# Patient Record
Sex: FEMALE | Race: BLACK OR AFRICAN AMERICAN
[De-identification: names, ages, dates, MRNs, and addresses within clinical notes are randomized per-mention and may not be internally consistent; named-entity substitution may affect disease eponyms.]

---

## 2018-02-22 NOTE — ED ABDOMINAL PAIN
General


Chief Complaint:  -Female


Stated Complaint:  CRAMPS/PELVIC PAIN


Nursing Triage Note:  


pt presents to er with complaint of abd cramping. that has lasted x1 week. 


states her LMP was 2 weeks ago. states that it does burn periodically when she 


urinates.


Source of Information:  Patient


Exam Limitations:  No Limitations





History of Present Illness


Date Seen by Provider:  Feb 22, 2018


Time Seen by Provider:  20:05


Initial Comments


This 17-year-old young lady presents to the emergency room with complaints of 

pelvic pain and cramping for the past 2 weeks.  Pain is worse with activity and 

movement.  She was doubled over at track practice today.  She is a collegiate 

athlete.  After practice she had supper and went home to rest.  She woke with 

intense pain and presented to the emergency room.  She has had some mild nausea 

without diarrhea or constipation her last bowel movement was this afternoon and 

was normal but she did have some pelvic pain with the bowel movement.  She 

denies relief of pain after bowel movements.  She reports that it hurts to laugh

, walk, and right in a car.  She has had a small amount of vaginal discharge 

and is sexually active.  She inconsistently uses barrier protection.  She has 

been monogamous with the same partner for 18 months.  Her last menstrual period 

was from February 10-13.  She has pain with intercourse and occasionally some 

pain with urination.





Allergies and Home Medications


Allergies


Coded Allergies:  


     No Allergy Information Available (Unverified , 2/22/18)





Review of Systems


Constitutional:  no symptoms reported


EENTM:  No Symptoms Reported


Cardiovascular:  No Symptoms Reported


Gastrointestinal:  See HPI


Genitourinary:  See HPI


Musculoskeletal:  no symptoms reported


Skin:  no symptoms reported


Psychiatric/Neurological:  No Symptoms Reported


Endocrine:  No Symptoms Reported





Past Medical-Social-Family Hx


Patient Social History


Alcohol Use:  Denies Use


Recreational Drug Use:  No


Smoking Status:  Never a Smoker


Recent Foreign Travel:  No


Contact w/Someone Who Travel:  No


Recent Infectious Disease Expo:  No


Recent Hopitalizations:  No


Ebola Symptoms:  Denies Symptoms Listed





Immunizations Up To Date


PED Vaccines UTD:  Yes





Seasonal Allergies


Seasonal Allergies:  No





Surgeries


History of Surgeries:  Yes (wisdom teeth)





Respiratory


History of Respiratory Disorde:  No





Cardiovascular


History of Cardiac Disorders:  No





Neurological


History of Neurological Disord:  No





Genitourinary


History of Genitourinary Disor:  No





Gastrointestinal


History of Gastrointestinal Di:  No





Musculoskeletal


History of Musculoskeletal Dis:  No





Endocrine


History of Endocrine Disorders:  No





HEENT


History of HEENT Disorders:  No





Cancer


History of Cancer:  No





Psychosocial


History of Psychiatric Problem:  No





Integumentary


History of Skin or Integumenta:  No





Blood Transfusions


History of Blood Disorders:  No





Physical Exam


Vital Signs





 VS - Last 72 Hours, by Label








 2/22/18 2/23/18





 20:20 01:25


 


Temp 98.2 98.2


 


Pulse 69 69


 


Resp 17 17


 


B/P (MAP) 115/81 


 


Pulse Ox  100


 


O2 Delivery Room Air Room Air





Capillary Refill :


General Appearance:  WD/WN, no apparent distress, thin


HEENT:  PERRL/EOMI, normal ENT inspection


Neck:  normal inspection


Respiratory:  lungs clear, normal breath sounds, no respiratory distress, no 

accessory muscle use


Cardiovascular:  regular rate, rhythm, no edema


Gastrointestinal:  normal bowel sounds, soft, tenderness (across the pelvis and 

more prominent in the right lower quadrant), No mass, other (positive psoas 

right greater than left)


Genital/Rectal:  normal genital exam, other (mild erythema around the cervical 

os with some greenish color mucus coming from the office.  No cervical motion 

tenderness.)


Extremities:  normal inspection, no pedal edema


Back:  normal inspection


Pelvic:  normal adnexa, no cerv. motion tender, no masses, discharge


Neurologic/Psychiatric:  CNs II-XII nml as tested, no motor/sensory deficits, 

alert, normal mood/affect, oriented x 3


Skin:  normal color, warm/dry





Progress/Results/Core Measures


Results/Orders


Lab Results





Laboratory Tests








Test


  2/22/18


20:20 2/22/18


21:12 2/22/18


22:44 Range/Units


 


 


Urine Color YELLOW     


 


Urine Clarity CLEAR     


 


Urine pH 8    5-9  


 


Urine Specific Gravity 1.015 L   1.016-1.022  


 


Urine Protein NEGATIVE    NEGATIVE  


 


Urine Glucose (UA) NEGATIVE    NEGATIVE  


 


Urine Ketones NEGATIVE    NEGATIVE  


 


Urine Nitrite NEGATIVE    NEGATIVE  


 


Urine Bilirubin NEGATIVE    NEGATIVE  


 


Urine Urobilinogen NORMAL    NORMAL  MG/DL


 


Urine Leukocyte Esterase 1+ H   NEGATIVE  


 


Urine RBC (Auto) NEGATIVE    NEGATIVE  


 


Urine RBC 0-2     /HPF


 


Urine WBC 5-10 H    /HPF


 


Urine Squamous Epithelial


Cells 0-2 


  


  


   /HPF


 


 


Urine Crystals NONE     /LPF


 


Urine Bacteria NONE     /HPF


 


Urine Casts NONE     /LPF


 


Urine Mucus NEGATIVE     /LPF


 


Urine Culture Indicated YES     


 


Urine Pregnancy Test NEGATIVE    NEGATIVE  


 


White Blood Count


  


  8.5 


  


  4.3-11.0


10^3/uL


 


Red Blood Count


  


  4.04 L


  


  4.35-5.85


10^6/uL


 


Hemoglobin  11.1 L  11.5-16.0  G/DL


 


Hematocrit  33 L  35-52  %


 


Mean Corpuscular Volume  82   80-99  FL


 


Mean Corpuscular Hemoglobin  28   25-34  PG


 


Mean Corpuscular Hemoglobin


Concent 


  33 


  


  32-36  G/DL


 


 


Red Cell Distribution Width  14.9 H  10.0-14.5  %


 


Platelet Count


  


  279 


  


  130-400


10^3/uL


 


Mean Platelet Volume  9.9   7.4-10.4  FL


 


Neutrophils (%) (Auto)  53   42-75  %


 


Lymphocytes (%) (Auto)  36   12-44  %


 


Monocytes (%) (Auto)  10   0-12  %


 


Eosinophils (%) (Auto)  2   0-10  %


 


Basophils (%) (Auto)  1   0-10  %


 


Neutrophils # (Auto)  4.5   1.8-7.8  X 10^3


 


Lymphocytes # (Auto)  3.0   1.0-4.0  X 10^3


 


Monocytes # (Auto)  0.8   0.0-1.0  X 10^3


 


Eosinophils # (Auto)


  


  0.1 


  


  0.0-0.3


10^3/uL


 


Basophils # (Auto)


  


  0.1 


  


  0.0-0.1


10^3/uL


 


Sodium Level  138   135-145  MMOL/L


 


Potassium Level  4.0   3.6-5.0  MMOL/L


 


Chloride Level  106     MMOL/L


 


Carbon Dioxide Level  21   21-32  MMOL/L


 


Anion Gap  11   5-14  MMOL/L


 


Blood Urea Nitrogen  13   7-18  MG/DL


 


Creatinine


  


  0.79 


  


  0.60-1.30


MG/DL


 


Estimat Glomerular Filtration


Rate 


  > 60 


  


   


 


 


BUN/Creatinine Ratio  16    


 


Glucose Level  98     MG/DL


 


Calcium Level  9.3   8.5-10.1  MG/DL


 


Total Bilirubin  0.2   0.1-1.0  MG/DL


 


Aspartate Amino Transf


(AST/SGOT) 


  19 


  


  5-34  U/L


 


 


Alanine Aminotransferase


(ALT/SGPT) 


  9 


  


  0-55  U/L


 


 


Alkaline Phosphatase  72     U/L


 


C-Reactive Protein High


Sensitivity 


  0.01 


  


  0.00-0.50


MG/DL


 


Total Protein  7.1   6.4-8.2  GM/DL


 


Albumin  4.0   3.2-4.5  GM/DL


 


Serum Pregnancy Test,


Qualitative 


  NEGATIVE 


  


  NEGATIVE  


 








My Orders





Orders - BRUEGGEMANN,JANKI T MD


Hcg,Qualitative Urine (2/22/18 20:05)


Ua Culture If Indicated (2/22/18 20:05)


Urine Culture (2/22/18 20:20)


Cbc With Automated Diff (2/22/18 21:04)


Comprehensive Metabolic Panel (2/22/18 21:04)


Hs C Reactive Protein (2/22/18 21:04)


Saline Lock/Iv-Start (2/22/18 21:04)


Hcg,Qualitative Serum (2/22/18 21:05)


Wet Prep (2/22/18 21:53)


Neisseria Gonorrhea Dna (2/22/18 21:53)


Chlamydia Dna (2/22/18 21:53)


Genital Culture (2/22/18 21:53)


Abdomen/Kub 1view (2/22/18 21:55)


Us Non Ob Transvaginal [id number] (2/22/18 21:53)


Us Appendix [id number] (2/22/18 22:57)


Ct Abd/Pelv W (Appendicitis) (2/22/18 23:27)


Iohexol Injection (Omnipaque 350 Mg/Ml 1 (2/22/18 23:45)


Sodium Chloride Flush (Catheter Flush Sy (2/22/18 23:45)


Ns (Ivpb) (Sodium Chloride 0.9%) (2/22/18 23:45)


Ceftriaxone Injection (Rocephin Injectio (2/23/18 00:45)


Azithromycin Tablet (Zithromax Tablet) (2/23/18 00:45)


Ketorolac Injection (Toradol Injection) (2/23/18 00:45)





Medications Given in ED





Current Medications








 Medications  Dose


 Ordered  Sig/Raven


 Route  Start Time


 Stop Time Status Last Admin


Dose Admin


 


 Azithromycin  1,000 mg  ONCE  ONCE


 PO  2/23/18 00:45


 2/23/18 00:47 DC 2/23/18 00:57


1,000 MG


 


 Ceftriaxone


 Sodium 1000 mg/


 Sodium Chloride  50 ml @ 


 100 mls/hr  ONCE  ONCE


 IV  2/23/18 00:45


 2/23/18 01:14 DC 2/23/18 00:57


100 MLS/HR


 


 Iohexol  75 ml  ONCE  ONCE


 IV  2/22/18 23:45


 2/22/18 23:46 DC 2/22/18 23:56


75 ML


 


 Ketorolac


 Tromethamine  30 mg  ONCE  ONCE


 IVP  2/23/18 00:45


 2/23/18 00:47 DC 2/23/18 00:56


30 MG


 


 Sodium Chloride  10 ml  AS NEEDED  PRN


 IV  2/22/18 23:45


 2/23/18 01:27 DC 2/22/18 23:57


10 ML


 


 Sodium Chloride  250 ml  ONCE  ONCE


 IV  2/22/18 23:45


 2/22/18 23:46 DC 2/22/18 23:57


80 ML








Vital Signs/I&O





Vital Sign - Last 12Hours








 2/22/18 2/23/18





 20:20 01:25


 


Temp 98.2 98.2


 


Pulse 69 69


 


Resp 17 17


 


B/P (MAP) 115/81 


 


Pulse Ox  100


 


O2 Delivery Room Air Room Air








Progress Note #1:  


   Time:  21:50


Progress Note


Patient was seen and examined.  Labs were obtained.  UA and labs were reviewed.

  Given normal CRP, WBC, and temperature, suspicion for appendicitis has been 

lessened.  I discussed options of going directly to CT scan versus ultrasound.  

Risks and benefits of each study were reviewed with patient and family.  

Patient and family wish to proceed with ultrasound as the safer option at this 

point.  I also recommended a pelvic exam as patient has had pain with 

intercourse and some vaginal discharge.


Progress Note #2:  


   Time:  23:28


Progress Note


Patient has a right-sided pelvic kidney identified on ultrasound.  I discussed 

with patient and mother.  Since this obscures the pelvic exam and appendix on 

ultrasound, we elected to proceed with CT scan.  Ultrasound technician also 

noted she was significantly tender on ultrasound.


Progress Note #3:  


Progress Note


There was question of a right adnexal cyst which could be contributing.  

Appendix was not seen on CT scan.  No other acute abnormalities were identified 

to explain her pain.  Toradol was given for treatment of pain.  Patient was 

empirically treated for possible vaginal infections with Rocephin 1 g IV and 

azithromycin 1 g orally.





Diagnostic Imaging





   Diagonstic Imaging:  Xray


   Plain Films/CT/US/NM/MRI:  abdomen


Comments


Abdominal x-ray viewed by me.  Report not yet available.  Moderate amount of 

stool and gas in the colon without any other acute abnormalities.








   Diagonstic Imaging:  Ultrasound


   Plain Films/CT/US/NM/MRI:  pelvis


Comments


Pelvic ultrasound revealed no visible appendix.  There is a questionable 

adnexal cyst on the right.  Patient was noted to have a right pelvic kidney.  

No other acute pathology found to explain patient's pain.








   Diagonstic Imaging:  CT


   Plain Films/CT/US/NM/MRI:  abdomen, pelvis


Comments


CT scans viewed by me and statin rad report reviewed.  There is questionable 

small bowel thickening in the left upper quadrant.  Right adnexal corpus luteum 

cyst noted.  Appendix not confidently visualized.  No signs of appendicitis.  

Right pelvic kidney.





Departure


Impression


Impression:  


 Primary Impression:  


 Pelvic kidney


 Additional Impressions:  


 Right lower quadrant pain


 Adnexal cyst


Disposition:  01 HOME, SELF-CARE


Condition:  Improved





Departure-Patient Inst.


Decision time for Depature:  00:50


Referrals:  


KIERA ZAVALA MD (PCP/Family)


Primary Care Physician


Patient Instructions:  Acute Abdomen (Belly Pain)





Add. Discharge Instructions:  


Follow-up with Dr. Zavala next week.  You may take ibuprofen up to 400 mg 

every 6 hours as needed for pain.  Add Tylenol up to 650 mg every 6 hours as 

needed for additional pain relief.  If you develop fevers, vomiting, worsening 

pain, or other symptoms, please return to the emergency room.  Observe vaginal 

rest (no intercourse, tampons, etc.) until cleared by your doctor.





Please use a second form of birth control in addition to condoms until you are 

ready to become pregnant.  Discuss your pelvic kidney and pelvic anatomy with 

your obstetrician before you decide to get pregnant.  Please inform any 

healthcare providers you interact with in the future that you have a displaced 

pelvic kidney on the right.











All discharge instructions reviewed with patient and/or family. Voiced 

understanding.





Copy


Copies To 1:   KIERA ZAVALA MD, JOSHUA T MD Feb 22, 2018 22:55

## 2018-02-22 NOTE — XMS REPORT
Continuity of Care Document

 Created on: 2018



AUDREY MADRID

External Reference #: [id number]

: 2000

Sex: Female



Demographics







 Address  [address] [place]  [address]

 

 Home Phone  [phone] x

 

 Preferred Language  Unknown

 

 Marital Status  Unknown

 

 L[employer]-day Affiliation  Unknown

 

 Race  Unknown

 

 Ethnic Group  Unknown





Author







 Author  [facility]

 

 Address  Unknown

 

 Phone  Unavailable



              



Allergies

      



There is no data.                  



Medications

      



There is no data.                  



Problems

      





 Date Dx Coded            Attending            Type            Code            
Diagnosis            Diagnosed By        

 

 2013            MARGO RICKS                         V04.89   
         GARDASIL (HPV) DX                     

 

 2013            MARGO RICKS                         V05.3    
        HEP A (PED/ADOL 2-DOSE) DX                     

 

 2013            MARGO RICSK                         V05.4    
        VARICELLA DX                     

 

 2013            MARGO RICKS                         V70.3    
        SPORTS PHYSICAL                     

 

 10/02/2013            MARGO RICKS                         462      
      PHARYNGITIS ACUTE                     

 

 2016            DEN SALEEM            Ot            M25.572   
         PAIN IN LEFT ANKLE AND JOINTS OF LEFT FO                     

 

 2017            DEN SALEEM            Ot            M25.572   
         PAIN IN LEFT ANKLE AND JOINTS OF LEFT FO                     



                              



Procedures

      





 Code            Description            Performed By            Performed On   
     

 

             53764                                  STREP A  (IN-HOUSE)        
                           10/02/2013        



                  



Results

      



There is no data.              



Encounters

      





 ACCT No.            Visit Date/Time            Discharge            Status    
        Pt. Type            Provider            Facility            Loc./Unit  
          Complaint        

 

 [id number]            10/02/2013 13:52:00            10/02/2013 23:59:59          
  [facility]            Outpatient            MARGO RICKS                  
                             

 

 [id number]            01/15/2013 20:22:56                                      
RECURRING                                                            

 

 [id number]            2016 13:19:00            2016 23:59:59    
        [facility]            Outpatient            DEN SALEEM            
[facility]            RAD            LEFT ANKLE PAIN, 
WEAKNESS

## 2018-02-22 NOTE — XMS REPORT
[facility]

 Created on: 2015



[address]

External Reference #: [id number]

: 2000

Sex: Female



Demographics







 Address  [address] [place]  [address]

 

 Home Phone  [phone]

 

 Preferred Language  Unknown

 

 Marital Status  Unknown

 

 [employer] Affiliation  Unknown

 

 Race  Other Race

 

 Ethnic Group  Not  or 





Author







 Author  MARGO MONTOYA

 

 Organization  [employer]

 

 Address  Unknown

 

 Phone  Unavailable







Care Team Providers







 Care Team Member Name  Role  Phone

 

 MARGO MONTOYA  CP  Unavailable



                                                                



Allergies, Adverse Reactions, Alerts

          





 Substance  Reaction  Event Type

 

 N.K.D.A.  Info Not Available  Non Drug Allergy



                                                                               
         



Problems

          





 Problem Type  Condition  Code  Onset Dates  Condition Status

 

 Assessment  Dietary counseling  Z71.3     Active

 

 Problem  STATE HEP A (ADULT) DX  V05.3     Active

 

 Problem  GARDASIL (HPV) DX  V04.89     Active

 

 Problem  Acute pharyngitis  462     Active

 

 Assessment  Sports physical  Z02.5     Active

 

 Assessment  Exercise counseling  Z71.89     Active

 

 Problem  Other general medical examination for administrative purposes  V70.3 
    Active

 

 Problem  VARICELLA DX  V05.4     Active



                                                                               
                                                                               



Medications

          No Known Medications                                                 
                                       



Procedures

          





 Procedure  Coding System  Code  Date

 

 Preventive Care Est Pt. Age 12-17  CPT-4  79579  2015

 

 VISUAL ACUITY SCREEN  CPT-4  70902  2015



                                                                               
                             



Vital Signs

          





 Date/Time:  2015

 

 Temperature  98 F

 

 BMIPercentile  29.48 %

 

 Weight  114 lbs

 

 Height  64.25 in

 

 BMI  19.41 Index

 

 Blood Pressure Diastolic  52 mmHg

 

 Blood Pressure Systolic  91 mmHg

 

 Cardiac Monitoring Heart Rate  72 bpm

 

 Wt Percentile  33.71 %

 

 Ht Percentile  51.77 %



                                                                              



Results

          No Known Results                                                     
               



Summary Purpose

          [employer] Submission

## 2018-02-23 NOTE — DIAGNOSTIC IMAGING REPORT
PROCEDURE: CT abdomen and pelvis with contrast, rule out

appendicitis.



TECHNIQUE: Multiple contiguous axial images were obtained through

the abdomen and pelvis after the administration of intravenous

contrast.



INDICATION: Right lower quadrant pain and cramping.



COMPARISON: No prior studies are available for comparison.



FINDINGS:  

The lung bases are clear. The liver is unremarkable. The

gallbladder is contracted. The pancreas and spleen are

unremarkable. No adrenal mass is identified. There is a right

pelvic kidney. The left kidney is unremarkable. Aorta is

nonaneurysmal. Bowel loops are  normal caliber although there

does appear to be questional circumferential wall thickening

involving small bowel loops in the left upper quadrant. No free

fluid in the abdomen is seen. There is a questionable cyst in the

right adnexa, perhaps ovarian. No definite free fluid in the

pelvis is seen. The uterus and bladder are unremarkable. The

appendix is not definitely visualized. No inflammatory process is

seen. There is moderate stool in the right colon.



IMPRESSION:

1. Nonvisualized appendix. No inflammatory process in the right

lower quadrant is identified.

2. Questionable wall thickening involving small bowel loops in

the left upper quadrant, perhaps owing to nonspecific enteritis.

3. Right pelvic kidney.











Dictated by: 



  Dictated on workstation # [id number]

## 2018-02-23 NOTE — DIAGNOSTIC IMAGING REPORT
INDICATION: Pain



FINDINGS:



The right kidney is pelvic, nondilated. The appendix cannot be

identified. There was no fluid collection or regional free fluid

and no noncompressible loop of bowel.



IMPRESSION:



Right pelvic kidney, nonvisualization of the appendix. No

noncompressible loop of bowel, free fluid or regional fluid

collection.



Dictated by: 



  Dictated on workstation # [id number]

## 2018-02-23 NOTE — DIAGNOSTIC IMAGING REPORT
INDICATION: Pelvic pain.



FINDINGS:  

A small amount of pelvic free fluid is identified showing no

obvious complexity. There is color flow to the ovaries. There is

no evidence for adnexal torsion. There is no fibroid or

myometrial mass, no loculated fluid collection. The endometrium

and myometrium appeared normal.



IMPRESSION:

Small pelvic free fluid showed no complexity and is likely

physiologic. No adnexal lesion. No acute finding.



Dictated by: 



  Dictated on workstation # [id number]
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
## 2020-10-18 ENCOUNTER — HOSPITAL ENCOUNTER (EMERGENCY)
Dept: HOSPITAL 75 - ER | Age: 20
Discharge: HOME | End: 2020-10-18
Payer: COMMERCIAL

## 2020-10-18 VITALS — WEIGHT: 74.96 LBS | BODY MASS INDEX: 12.49 KG/M2 | HEIGHT: 64.96 IN

## 2020-10-18 VITALS — SYSTOLIC BLOOD PRESSURE: 99 MMHG | DIASTOLIC BLOOD PRESSURE: 61 MMHG

## 2020-10-18 DIAGNOSIS — R06.4: ICD-10-CM

## 2020-10-18 DIAGNOSIS — F10.129: ICD-10-CM

## 2020-10-18 DIAGNOSIS — F17.290: ICD-10-CM

## 2020-10-18 DIAGNOSIS — F12.10: Primary | ICD-10-CM

## 2020-10-18 DIAGNOSIS — F41.9: ICD-10-CM

## 2020-10-18 LAB
ALBUMIN SERPL-MCNC: 4.7 GM/DL (ref 3.2–4.5)
ALP SERPL-CCNC: 63 U/L (ref 40–136)
ALT SERPL-CCNC: 11 U/L (ref 0–55)
APAP SERPL-MCNC: < 10 UG/ML (ref 10–30)
APTT PPP: YELLOW S
BACTERIA #/AREA URNS HPF: NEGATIVE /HPF
BARBITURATES UR QL: NEGATIVE
BASOPHILS # BLD AUTO: 0.1 10^3/UL (ref 0–0.1)
BASOPHILS NFR BLD AUTO: 1 % (ref 0–10)
BENZODIAZ UR QL SCN: NEGATIVE
BILIRUB SERPL-MCNC: 0.3 MG/DL (ref 0.1–1)
BILIRUB UR QL STRIP: NEGATIVE
BUN/CREAT SERPL: 10
CALCIUM SERPL-MCNC: 9.5 MG/DL (ref 8.5–10.1)
CHLORIDE SERPL-SCNC: 106 MMOL/L (ref 98–107)
CO2 SERPL-SCNC: 17 MMOL/L (ref 21–32)
COCAINE UR QL: POSITIVE
CREAT SERPL-MCNC: 0.81 MG/DL (ref 0.6–1.3)
EOSINOPHIL # BLD AUTO: 0 10^3/UL (ref 0–0.3)
EOSINOPHIL NFR BLD AUTO: 0 % (ref 0–10)
FIBRINOGEN PPP-MCNC: CLEAR MG/DL
GFR SERPLBLD BASED ON 1.73 SQ M-ARVRAT: > 60 ML/MIN
GLUCOSE SERPL-MCNC: 107 MG/DL (ref 70–105)
GLUCOSE UR STRIP-MCNC: NEGATIVE MG/DL
HCT VFR BLD CALC: 38 % (ref 35–52)
HGB BLD-MCNC: 13.6 G/DL (ref 11.5–16)
KETONES UR QL STRIP: NEGATIVE
LEUKOCYTE ESTERASE UR QL STRIP: NEGATIVE
LYMPHOCYTES # BLD AUTO: 2.6 10^3/UL (ref 1–4)
LYMPHOCYTES NFR BLD AUTO: 22 % (ref 12–44)
MAGNESIUM SERPL-MCNC: 2 MG/DL (ref 1.6–2.4)
MANUAL DIFFERENTIAL PERFORMED BLD QL: NO
MCH RBC QN AUTO: 32 PG (ref 25–34)
MCHC RBC AUTO-ENTMCNC: 36 G/DL (ref 32–36)
MCV RBC AUTO: 90 FL (ref 80–99)
METHADONE UR QL SCN: NEGATIVE
METHAMPHETAMINE SCREEN URINE S: NEGATIVE
MONOCYTES # BLD AUTO: 0.7 10^3/UL (ref 0–1)
MONOCYTES NFR BLD AUTO: 6 % (ref 0–12)
NEUTROPHILS # BLD AUTO: 8.8 10^3/UL (ref 1.8–7.8)
NEUTROPHILS NFR BLD AUTO: 72 % (ref 42–75)
NITRITE UR QL STRIP: NEGATIVE
OPIATES UR QL SCN: NEGATIVE
OTHER ELEMENTS URNS MICRO: (no result) /HPF
OXYCODONE UR QL: NEGATIVE
PH UR STRIP: 7 [PH] (ref 5–9)
PLATELET # BLD: 362 10^3/UL (ref 130–400)
PMV BLD AUTO: 9.3 FL (ref 9–12.2)
POTASSIUM SERPL-SCNC: 3.5 MMOL/L (ref 3.6–5)
PROPOXYPH UR QL: NEGATIVE
PROT SERPL-MCNC: 7.7 GM/DL (ref 6.4–8.2)
PROT UR QL STRIP: NEGATIVE
RBC #/AREA URNS HPF: (no result) /HPF
SODIUM SERPL-SCNC: 142 MMOL/L (ref 135–145)
SP GR UR STRIP: <=1.005 (ref 1.02–1.02)
SQUAMOUS #/AREA URNS HPF: (no result) /HPF
TRICYCLICS UR QL SCN: NEGATIVE
WBC # BLD AUTO: 12.2 10^3/UL (ref 4.3–11)
WBC #/AREA URNS HPF: (no result) /HPF

## 2020-10-18 PROCEDURE — 83735 ASSAY OF MAGNESIUM: CPT

## 2020-10-18 PROCEDURE — 80053 COMPREHEN METABOLIC PANEL: CPT

## 2020-10-18 PROCEDURE — 36415 COLL VENOUS BLD VENIPUNCTURE: CPT

## 2020-10-18 PROCEDURE — 80306 DRUG TEST PRSMV INSTRMNT: CPT

## 2020-10-18 PROCEDURE — 80320 DRUG SCREEN QUANTALCOHOLS: CPT

## 2020-10-18 PROCEDURE — 93005 ELECTROCARDIOGRAM TRACING: CPT

## 2020-10-18 PROCEDURE — 80329 ANALGESICS NON-OPIOID 1 OR 2: CPT

## 2020-10-18 PROCEDURE — 85025 COMPLETE CBC W/AUTO DIFF WBC: CPT

## 2020-10-18 PROCEDURE — 81000 URINALYSIS NONAUTO W/SCOPE: CPT

## 2020-10-18 PROCEDURE — 99283 EMERGENCY DEPT VISIT LOW MDM: CPT

## 2020-10-18 PROCEDURE — 93041 RHYTHM ECG TRACING: CPT

## 2020-10-18 PROCEDURE — 84703 CHORIONIC GONADOTROPIN ASSAY: CPT

## 2020-10-18 NOTE — NUR
[facility] TO GET PATIENT UP TO [facility] TO VOID. SAMPLE 
TO BE COLLECTED.

## 2020-10-18 NOTE — NUR
ATTEMPTED CALL TO HER DAD FOR HER HOWEVER THE NUMBER IN THE SYSTEM IS 
INCORRECT. PATIENT NOTIFIED SO SHE CAN MAKE OTHER ARRANGEMENTS FOR A RIDE.

## 2020-10-18 NOTE — NUR
THIS RN ASKED PATIENT IF SHE WOULD LIKE ME TO CALL HER DAD FOR HER TO COME AND 
PICK HER UP, PATIENT AGREES AND THANKS THIS RN. PATIENT ESCORTED TO 
REGISTRATION. PATIENT IS STEADY ON HER FEET AND ALERT AND ORIENTED X4.

## 2020-10-18 NOTE — ED PSYCHOSOCIAL
General


Chief Complaint:  Substance Abuse


Stated Complaint:  OVERDOSE


Source:  patient, EMS, old records





History of Present Illness


Date Seen by Provider:  Oct 18, 2020


Time Seen by Provider:  03:13


Initial Comments


PT ARRIVES VIA EMS FROM A FRIEND'S HOUSE'


PT STATES [facility] AND DRANK "2 RED BULL VODKAS" AND ALSO TOOK 

"DIEUDONNE" BY MOUTH AND SNORTED A LINE OF COCAINE. 


STATES SHE DOES NOT DRINK VERY OFTEN AND HAS NEVER USED EITHER OF THESE DRUGS 

BEFORE. 


STATES SHE SMOKES MARIJUANA DAILY. 


NOW PT IS EXTREMELY ANXIOUS, HYPERVENTILATING TO THE POINT OF PASSING OUT, 

QUICKLY WAKING TO VERBAL AND TACTILE STIMULI AND IMMEDIATELY STARTS 

HYPERVENTILATING AGAIN. 





PT HAS HISTORY OF ANXIETY WITH HYPERVENTILATION


PT HAS BEEN PRESCRIBED EFFEXOR AND HYDROXYZINE, BUT HAS NOT TAKEN ANY TODAY





PCP: DR. ARIAS





Allergies and Home Medications


Allergies


Coded Allergies:  


     No Known Drug Allergies (Unverified , 10/18/20)





Home Medications


Hydroxyzine Pamoate 50 Mg Capsule, 50 MG PO Q6H


   Prescribed by: GERMAIN MONTES on 10/21/18 0218


Nitrofurantoin Monohyd/M-Cryst 100 Mg Capsule, 100 MG PO BID


   Prescribed by: GERMAIN MONTES on 10/21/18 0218





Patient Home Medication List


Home Medication List Reviewed:  Yes





Review of Systems


Constitutional:  no symptoms reported


Respiratory:  other (HYPERVENTILATING)


Birth Control/STD Prophylaxis:  BC Pills


Psychiatric/Neurological:  See HPI





Past Medical-Social-Family Hx


Past Med/Social Hx:  Reviewed and Corrections made


Patient Social History


Alcohol Use:  Occasionally Uses


Recreational Drug Use:  Yes (THC DAILY; 10/18/20-"DIEUDONNE" AND COCAINE. )


Drug of Choice:  THC DAILY; 10/18/20--"DIEUDONNE" AND COCAINE


Smoking Status:  Current Everyday Smoker (VAPES)


Type Used:  Electronic/Vapor


2nd Hand Smoke Exposure:  No


Recent Hopitalizations:  No





Immunizations Up To Date


PED Vaccines UTD:  Yes





Seasonal Allergies


Seasonal Allergies:  No





Past Medical History


Surgeries:  Yes (BILATERAL KNEE SCOPES; WISDOM TEETH REMOVED)


Orthopedic


Respiratory:  No


Cardiac:  No


Neurological:  No


Genitourinary:  No


Gastrointestinal:  No


Musculoskeletal:  No


Endocrine:  No


HEENT:  Yes (S/P T&A)


Tonsilitis


Cancer:  No


Psychosocial:  Yes (ANOREXIA)


Eating Disorder, Anxiety


Integumentary:  No


Blood Disorders:  No





Physical Exam





Vital Signs - First Documented








 10/18/20 10/18/20





 03:17 05:21


 


Temp 36.0 


 


Pulse 117 


 


Resp 40 


 


B/P (MAP) 117/78 (91) 


 


Pulse Ox 100 


 


O2 Delivery  Room Air





Capillary Refill :


Height, Weight, BMI


Height: 5'5.00"


Weight: 110lbs. oz. 49.456284oa; 14.06 BMI


Method:Estimated


General Appearance:  thin, other (SEVERE HYPERVENTILATION, EXTREMELY ANXIOUS, 

HYSTERICAL. VOICE STUTTERING, AND PT TREMULOUS. )


HEENT:  PERRL/EOMI


Neck:  normal inspection


Respiratory:  normal breath sounds, other (HYPERVENTILATING)


Cardiovascular:  no edema, no murmur, tachycardia


Gastrointestinal:  non tender, soft


Extremities:  normal inspection, normal capillary refill


Neurologic/Psychiatric:  CNs II-XII nml as tested, no motor/sensory deficits, 

alert, oriented x 3


Appearance/Memory:  appropriate appearance, neat, no memory impairment


Behavior/Eye Contact:  cooperative, good eye contact, increased rate of speech


Thoughts/Hallucinations:  no apparent hallucination


Skin:  normal color (PT IS BLACK); No rash





Progress/Results/Core Measures


Results/Orders


Lab Results





Laboratory Tests








Test


 10/18/20


03:17 10/18/20


03:43 Range/Units


 


 


White Blood Count


 12.2 H


 


 4.3-11.0


10^3/uL


 


Red Blood Count


 4.25 


 


 3.80-5.11


10^6/uL


 


Hemoglobin 13.6   11.5-16.0  g/dL


 


Hematocrit 38   35-52  %


 


Mean Corpuscular Volume 90   80-99  fL


 


Mean Corpuscular Hemoglobin 32   25-34  pg


 


Mean Corpuscular Hemoglobin


Concent 36 


 


 32-36  g/dL





 


Red Cell Distribution Width 12.0   10.0-14.5  %


 


Platelet Count


 362 


 


 130-400


10^3/uL


 


Mean Platelet Volume 9.3   9.0-12.2  fL


 


Immature Granulocyte % (Auto) 0    %


 


Neutrophils (%) (Auto) 72   42-75  %


 


Lymphocytes (%) (Auto) 22   12-44  %


 


Monocytes (%) (Auto) 6   0-12  %


 


Eosinophils (%) (Auto) 0   0-10  %


 


Basophils (%) (Auto) 1   0-10  %


 


Neutrophils # (Auto)


 8.8 H


 


 1.8-7.8


10^3/uL


 


Lymphocytes # (Auto)


 2.6 


 


 1.0-4.0


10^3/uL


 


Monocytes # (Auto)


 0.7 


 


 0.0-1.0


10^3/uL


 


Eosinophils # (Auto)


 0.0 


 


 0.0-0.3


10^3/uL


 


Basophils # (Auto)


 0.1 


 


 0.0-0.1


10^3/uL


 


Immature Granulocyte # (Auto)


 0.0 


 


 0.0-0.1


10^3/uL


 


Sodium Level 142   135-145  MMOL/L


 


Potassium Level 3.5 L  3.6-5.0  MMOL/L


 


Chloride Level 106     MMOL/L


 


Carbon Dioxide Level 17 L  21-32  MMOL/L


 


Anion Gap 19 H  5-14  MMOL/L


 


Blood Urea Nitrogen 8   7-18  MG/DL


 


Creatinine


 0.81 


 


 0.60-1.30


MG/DL


 


Estimat Glomerular Filtration


Rate > 60 


 


  





 


BUN/Creatinine Ratio 10    


 


Glucose Level 107 H    MG/DL


 


Calcium Level 9.5   8.5-10.1  MG/DL


 


Corrected Calcium    8.5-10.1  MG/DL


 


Magnesium Level 2.0   1.6-2.4  MG/DL


 


Total Bilirubin 0.3   0.1-1.0  MG/DL


 


Aspartate Amino Transf


(AST/SGOT) 20 


 


 5-34  U/L





 


Alanine Aminotransferase


(ALT/SGPT) 11 


 


 0-55  U/L





 


Alkaline Phosphatase 63     U/L


 


Total Protein 7.7   6.4-8.2  GM/DL


 


Albumin 4.7 H  3.2-4.5  GM/DL


 


Serum Pregnancy Test,


Qualitative NEGATIVE 


 


 NEGATIVE  





 


Acetaminophen Level < 10 L  10-30  UG/ML


 


Serum Alcohol 163 H  <10  MG/DL


 


Urine Color  YELLOW   


 


Urine Clarity  CLEAR   


 


Urine pH  7.0  5-9  


 


Urine Specific Gravity  <=1.005  1.016-1.022  


 


Urine Protein  NEGATIVE  NEGATIVE  


 


Urine Glucose (UA)  NEGATIVE  NEGATIVE  


 


Urine Ketones  NEGATIVE  NEGATIVE  


 


Urine Nitrite  NEGATIVE  NEGATIVE  


 


Urine Bilirubin  NEGATIVE  NEGATIVE  


 


Urine Urobilinogen  0.2  < = 1.0  MG/DL


 


Urine Leukocyte Esterase  NEGATIVE  NEGATIVE  


 


Urine RBC (Auto)  1+ H NEGATIVE  


 


Urine RBC  0-2   /HPF


 


Urine WBC  0-2   /HPF


 


Urine Squamous Epithelial


Cells 


 5-10 


  /HPF





 


Urine Crystals  NONE   /LPF


 


Urine Bacteria  NEGATIVE   /HPF


 


Urine Casts  NONE   /LPF


 


Urine Mucus  NEGATIVE   /LPF


 


Urine Other  FEW SPERM H  /HPF


 


Urine Culture Indicated  NO   


 


Urine Opiates Screen  NEGATIVE  NEGATIVE  


 


Urine Oxycodone Screen  NEGATIVE  NEGATIVE  


 


Urine Methadone Screen  NEGATIVE  NEGATIVE  


 


Urine Propoxyphene Screen  NEGATIVE  NEGATIVE  


 


Urine Barbiturates Screen  NEGATIVE  NEGATIVE  


 


Ur Tricyclic Antidepressants


Screen 


 NEGATIVE 


 NEGATIVE  





 


Urine Phencyclidine Screen  NEGATIVE  NEGATIVE  


 


Urine Amphetamines Screen  POSITIVE H NEGATIVE  


 


Urine Methamphetamines Screen  NEGATIVE  NEGATIVE  


 


Urine Benzodiazepines Screen  NEGATIVE  NEGATIVE  


 


Urine Cocaine Screen  POSITIVE H NEGATIVE  


 


Urine Cannabinoids Screen  POSITIVE H NEGATIVE  








My Orders





Orders - JYOTI,GERMAIN BRENNEN PADILLA


Ed Iv/Invasive Line Start (10/18/20 03:23)


Ekg Tracing (10/18/20 03:23)


Monitor-Rhythm Ecg Trace Only (10/18/20 03:23)


Acetaminophen (10/18/20 03:23)


Alcohol (10/18/20 03:23)


Cbc With Automated Diff (10/18/20 03:23)


Comprehensive Metabolic Panel (10/18/20 03:23)


Drug Screen Stat (Urine) (10/18/20 03:23)


Hcg,Qualitative Serum (10/18/20 03:23)


Magnesium (10/18/20 03:23)


Ua Culture If Indicated (10/18/20 03:23)


Ed Iv/Invasive Line Start (10/18/20 03:23)


Lactated Ringers (Lr 1000 Ml Iv Solution (10/18/20 03:23)


Lorazepam Injection (Ativan Injection) (10/18/20 03:30)


Straight Cath For Spec.-Adult (10/18/20 03:25)


Ed Iv/Invasive Line Start (10/18/20 04:26)


Lactated Ringers (Lr 1000 Ml Iv Solution (10/18/20 04:26)


Hydroxyzine Cap/Tab (Vistaril) (10/18/20 04:45)





Medications Given in ED








Progress


Progress Note :  


Progress Note


PLACED ON NON-REBREATHER MASK FOR HYPERVENTILATION


GIVEN IV FLUIDS AND ATIVAN AND HYDROXYZINE WITH IMPROVEMENT


PT CALMER AND ABLE TO SPEAK NORMALLY  AND WALKS UPRIGHT WITHOUT DIFFICULTY. 





NO DETERIORATION IN PT'S CONDITION DURING ER STAY


Initial ECG Impression Date:  Oct 18, 2020


Initial ECG Impression Time:  03:47


Initial ECG Rate:  105


Initial ECG Rhythm:  S.Tach (SINUS ARRHYTHMIA)


Initial ECG Impression:  Nonspecific Changes





Departure


Impression





   Primary Impression:  


   Drug abuse


   Additional Impressions:  


   Acute alcoholic intoxication


   Hyperventilation syndrome


Disposition:  01 HOME, SELF-CARE


Condition:  Improved





Departure-Patient Inst.


Referrals:  


KIERA ARIAS MD (PCP/Family)


Primary Care Physician


Patient Instructions:  ALCOHOL AND SUBSTANCE ABUSE, Drug Abuse and Drug 

Addiction (DC), Alcohol Abuse and Alcoholism (DC), Hyperventilation





Add. Discharge Instructions:  


NO ALCOHOL!!!





NO DRUGS!!!!





TAKE YOUR REGULAR MEDICATION AS PRESCRIBED





CLEAR LIQUIDS-WATER, BROTH, JELLO, GATORADE





FOLLOW UP WITH DR. ARIAS IN 2-3 DAYS FOR FURTHER CARE, RETURN TO ER IF WORSE





All discharge instructions reviewed with patient and/or family. Voiced 

understanding.











GERMAIN MONTES DO                 Oct 18, 2020 03:42

## 2021-03-20 ENCOUNTER — HOSPITAL ENCOUNTER (EMERGENCY)
Dept: HOSPITAL 19 - COL.ER | Age: 21
Discharge: HOME | End: 2021-03-20
Attending: FAMILY MEDICINE
Payer: COMMERCIAL

## 2021-03-20 VITALS — SYSTOLIC BLOOD PRESSURE: 101 MMHG | DIASTOLIC BLOOD PRESSURE: 63 MMHG | HEART RATE: 78 BPM

## 2021-03-20 VITALS — TEMPERATURE: 98.2 F

## 2021-03-20 VITALS — WEIGHT: 125.22 LBS | BODY MASS INDEX: 20.86 KG/M2 | HEIGHT: 65 IN

## 2021-03-20 DIAGNOSIS — Z87.891: ICD-10-CM

## 2021-03-20 DIAGNOSIS — Z3A.13: ICD-10-CM

## 2021-03-20 DIAGNOSIS — R10.32: ICD-10-CM

## 2021-03-20 DIAGNOSIS — O26.891: Primary | ICD-10-CM

## 2021-03-20 LAB
ALBUMIN SERPL-MCNC: 3.6 GM/DL (ref 3.5–5)
ALP SERPL-CCNC: 45 U/L (ref 50–136)
ALT SERPL-CCNC: 6 U/L (ref 4–34)
ANION GAP SERPL CALC-SCNC: 7 MMOL/L (ref 7–16)
AST SERPL-CCNC: 22 U/L (ref 15–37)
BASOPHILS # BLD: 0 10*3/UL (ref 0–0.2)
BASOPHILS NFR BLD AUTO: 0.5 % (ref 0–2)
BILIRUB SERPL-MCNC: 0.1 MG/DL (ref 0–1)
BUN SERPL-MCNC: 8 MG/DL (ref 7–17)
CALCIUM SERPL-MCNC: 8.8 MG/DL (ref 8.4–10.2)
CHLORIDE SERPL-SCNC: 104 MMOL/L (ref 98–107)
CO2 SERPL-SCNC: 23 MMOL/L (ref 22–30)
CREAT SERPL-SCNC: 0.57 UMOL/L (ref 0.52–1.25)
EOSINOPHIL # BLD: 0.1 10*3/UL (ref 0–0.7)
EOSINOPHIL NFR BLD: 0.6 % (ref 0–4)
ERYTHROCYTE [DISTWIDTH] IN BLOOD BY AUTOMATED COUNT: 12.7 % (ref 11.5–14.5)
GLUCOSE SERPL-MCNC: 86 MG/DL (ref 74–106)
GRANULOCYTES # BLD AUTO: 72 % (ref 42.2–75.2)
HCT VFR BLD AUTO: 31.8 % (ref 37–47)
HGB BLD-MCNC: 10.6 G/DL (ref 12.5–16)
LYMPHOCYTES # BLD: 1.8 10*3/UL (ref 1.2–3.4)
LYMPHOCYTES NFR BLD: 20.4 % (ref 20–51)
MCH RBC QN AUTO: 31 PG (ref 27–31)
MCHC RBC AUTO-ENTMCNC: 33 G/DL (ref 33–37)
MCV RBC AUTO: 94 FL (ref 80–100)
MONOCYTES # BLD: 0.5 10*3/UL (ref 0.1–0.6)
MONOCYTES NFR BLD AUTO: 6 % (ref 1.7–9.3)
NEUTROPHILS # BLD: 6.3 10*3/UL (ref 1.4–6.5)
PH UR STRIP.AUTO: 8 [PH] (ref 5–8)
PLATELET # BLD AUTO: 206 K/MM3 (ref 130–400)
PMV BLD AUTO: 9.9 FL (ref 7.4–10.4)
POTASSIUM SERPL-SCNC: 3.8 MMOL/L (ref 3.4–5)
PROT SERPL-MCNC: 6.6 GM/DL (ref 6.4–8.2)
RBC # BLD AUTO: 3.4 M/MM3 (ref 4.1–5.3)
RBC # UR: (no result) /HPF
SODIUM SERPL-SCNC: 134 MMOL/L (ref 137–145)
SP GR UR STRIP.AUTO: 1.01 (ref 1–1.03)
SQUAMOUS # URNS: (no result) /HPF
URN COLLECT METHOD CLASS: (no result)

## 2021-05-14 ENCOUNTER — HOSPITAL ENCOUNTER (OUTPATIENT)
Dept: HOSPITAL 19 - LDRO | Age: 21
Discharge: HOME | End: 2021-05-14
Attending: OBSTETRICS & GYNECOLOGY
Payer: COMMERCIAL

## 2021-05-14 VITALS — WEIGHT: 120.15 LBS | HEIGHT: 65 IN | BODY MASS INDEX: 20.02 KG/M2

## 2021-05-14 VITALS — SYSTOLIC BLOOD PRESSURE: 106 MMHG | HEART RATE: 76 BPM | TEMPERATURE: 98.4 F | DIASTOLIC BLOOD PRESSURE: 60 MMHG

## 2021-05-14 DIAGNOSIS — Z3A.23: ICD-10-CM

## 2021-05-14 DIAGNOSIS — O21.2: Primary | ICD-10-CM

## 2021-05-14 LAB
ALBUMIN SERPL-MCNC: 3.9 GM/DL (ref 3.5–5)
ALP SERPL-CCNC: 47 U/L (ref 50–136)
ALT SERPL-CCNC: 12 U/L (ref 4–34)
ANION GAP SERPL CALC-SCNC: 8 MMOL/L (ref 7–16)
AST SERPL-CCNC: 32 U/L (ref 15–37)
BILIRUB SERPL-MCNC: 0.2 MG/DL (ref 0–1)
BUN SERPL-MCNC: 11 MG/DL (ref 7–17)
CALCIUM SERPL-MCNC: 8.9 MG/DL (ref 8.4–10.2)
CHLORIDE SERPL-SCNC: 105 MMOL/L (ref 98–107)
CO2 SERPL-SCNC: 21 MMOL/L (ref 22–30)
CREAT SERPL-SCNC: 0.5 UMOL/L (ref 0.52–1.25)
GLUCOSE SERPL-MCNC: 82 MG/DL (ref 74–106)
POTASSIUM SERPL-SCNC: 3.7 MMOL/L (ref 3.4–5)
PROT SERPL-MCNC: 7.1 GM/DL (ref 6.4–8.2)
SODIUM SERPL-SCNC: 134 MMOL/L (ref 137–145)

## 2021-05-14 NOTE — NUR
0137- PT PRESENTS TO LDR COMPLAINING OF NAUSEA AND VOMITTING, ALSO SOME BLOOD
IN HER VOMIT. AMBULATORY TO ROOM LR6, CHANGED INTO GOWN.
0142- VSS. T'S DOPPLERED 145. PT REPORTS SHE IS FEELING MOVEMENT, DENIES
VAGINAL LEAKING OR BLEEDING. PLAN OF CARE DISCUSSED.
0150- NURSING ADMISSION AND HISTORY TAKEN. DR OSULLIVAN UPDATED ON PT HISTORY
AND COMPLAINT. ORDERS RECEIVED.
0215- IV START TO LEFT HAND X3 ATTEMPTS. BLOOD DRAWN FOR LABS AND LR INFUSING
AS ORDERED, MEDS GIVEN AS ORDERED.
0305- LR STILL INFUSING, PT REPORTS THAT SHE IS FEELING BETTER AFTER FLUIDS
AND MEDS. SHE WILL CALL OUT WHEN HER FLUIDS ARE DONE INFUSING.
0310- DR OSULILVAN AT DESK AND IS UPDATED ON PT. HE REVIEWS LABWORK. STATES PT
MAY DISMISS TO HOME WHEN HER FLUIDS ARE IN IF SHE IS STILL FEELING BETTER.
0315- PT UPDATED ON PLAN OF CARE.
0355- LR DONE INFUSING. IV SITE DC'D WITH CANULA INTACT. FHT'S DOPPLERED 140.
0420- DISCHARGE INSTRUCTIONS GIVEN AND PT VERBALIZES UNDERSTANDING. PT
DISMISSED TO HOME ACCOMPANIED BY BOYFRIEND.

## 2021-07-06 ENCOUNTER — HOSPITAL ENCOUNTER (EMERGENCY)
Dept: HOSPITAL 19 - COL.ER | Age: 21
Discharge: LEFT BEFORE DISCHARGE | End: 2021-07-06
Payer: COMMERCIAL

## 2021-07-06 VITALS — HEIGHT: 65 IN | BODY MASS INDEX: 23.36 KG/M2 | WEIGHT: 140.21 LBS

## 2021-07-06 VITALS — DIASTOLIC BLOOD PRESSURE: 61 MMHG | HEART RATE: 84 BPM | SYSTOLIC BLOOD PRESSURE: 102 MMHG

## 2021-07-06 DIAGNOSIS — R52: Primary | ICD-10-CM

## 2021-07-29 ENCOUNTER — HOSPITAL ENCOUNTER (OUTPATIENT)
Dept: HOSPITAL 19 - LDR | Age: 21
Discharge: HOME | End: 2021-07-29
Payer: COMMERCIAL

## 2021-07-29 VITALS — HEART RATE: 89 BPM | SYSTOLIC BLOOD PRESSURE: 104 MMHG | DIASTOLIC BLOOD PRESSURE: 64 MMHG | TEMPERATURE: 98.7 F

## 2021-07-29 VITALS — WEIGHT: 142.2 LBS | BODY MASS INDEX: 23.69 KG/M2 | HEIGHT: 65 IN

## 2021-07-29 VITALS — DIASTOLIC BLOOD PRESSURE: 61 MMHG | HEART RATE: 92 BPM | SYSTOLIC BLOOD PRESSURE: 101 MMHG

## 2021-07-29 DIAGNOSIS — O46.93: Primary | ICD-10-CM

## 2021-07-29 DIAGNOSIS — Z3A.34: ICD-10-CM

## 2021-07-29 LAB
ALBUMIN SERPL-MCNC: 3.4 GM/DL (ref 3.5–5)
ALP SERPL-CCNC: 108 U/L (ref 50–136)
ALT SERPL-CCNC: 5 U/L (ref 4–34)
ANION GAP SERPL CALC-SCNC: 4 MMOL/L (ref 7–16)
AST SERPL-CCNC: 16 U/L (ref 15–37)
BILIRUB SERPL-MCNC: 0.1 MG/DL (ref 0–1)
BUN SERPL-MCNC: 9 MG/DL (ref 7–17)
CALCIUM SERPL-MCNC: 8.6 MG/DL (ref 8.4–10.2)
CHLORIDE SERPL-SCNC: 109 MMOL/L (ref 98–107)
CO2 SERPL-SCNC: 22 MMOL/L (ref 22–30)
CREAT SERPL-SCNC: 0.57 UMOL/L (ref 0.52–1.25)
ERYTHROCYTE [DISTWIDTH] IN BLOOD BY AUTOMATED COUNT: 13.6 % (ref 11.5–14.5)
GLUCOSE SERPL-MCNC: 92 MG/DL (ref 74–106)
HCT VFR BLD AUTO: 28.6 % (ref 37–47)
HGB BLD-MCNC: 9.7 G/DL (ref 12.5–16)
MCH RBC QN AUTO: 30 PG (ref 27–31)
MCHC RBC AUTO-ENTMCNC: 34 G/DL (ref 33–37)
MCV RBC AUTO: 89 FL (ref 80–100)
PH UR STRIP.AUTO: 7 [PH] (ref 5–8)
PLATELET # BLD AUTO: 203 K/MM3 (ref 130–400)
PMV BLD AUTO: 10.2 FL (ref 7.4–10.4)
POTASSIUM SERPL-SCNC: 3.7 MMOL/L (ref 3.4–5)
PROT SERPL-MCNC: 6.6 GM/DL (ref 6.4–8.2)
RBC # BLD AUTO: 3.21 M/MM3 (ref 4.1–5.3)
RBC # UR STRIP.AUTO: (no result) /UL
RBC # UR: (no result) /HPF
SODIUM SERPL-SCNC: 135 MMOL/L (ref 137–145)
SP GR UR STRIP.AUTO: 1.01 (ref 1–1.03)
SQUAMOUS # URNS: (no result) /HPF
URN COLLECT METHOD CLASS: (no result)
WBC # UR: (no result) /HPF

## 2021-07-29 NOTE — NUR
0935- 34.4 G1L0 here with c/o decreased fetal movement and vaginal bleeding.
Reports rare contraction. Denies any LOF. Ambulatory to LDR3 with SO. Oriented
to room and POC. Changes into clean gown. UA collected per prior orders from
Dr. Bennett.
 
0945- EFM explained and placed. SVE CL/TH/HI. VS obtained. Assessment
completed. Labs drawn per prior orders from Dr. Bennett.

## 2021-07-29 NOTE — NUR
1042- Dr. Bennett updated on patient. See physician notification.
 
1045- EFM off. Pt updated on poc.
 
1105- Discharge instructions reviewed with pt and SO who verbalize
understanding. Ambulatory off unit.

## 2021-09-01 ENCOUNTER — HOSPITAL ENCOUNTER (OUTPATIENT)
Dept: HOSPITAL 19 - LDRO | Age: 21
LOS: 1 days | Discharge: HOME | End: 2021-09-02
Attending: OBSTETRICS & GYNECOLOGY
Payer: COMMERCIAL

## 2021-09-01 VITALS — HEART RATE: 86 BPM | SYSTOLIC BLOOD PRESSURE: 105 MMHG | DIASTOLIC BLOOD PRESSURE: 61 MMHG

## 2021-09-01 VITALS — HEIGHT: 65 IN | BODY MASS INDEX: 25.53 KG/M2 | WEIGHT: 153.22 LBS

## 2021-09-01 VITALS — SYSTOLIC BLOOD PRESSURE: 102 MMHG | TEMPERATURE: 98.9 F | HEART RATE: 93 BPM | DIASTOLIC BLOOD PRESSURE: 56 MMHG

## 2021-09-01 DIAGNOSIS — Z3A.39: ICD-10-CM

## 2021-09-01 NOTE — NUR
PT ARRIVES VIA WHEELCHAIRI TO UNIT C/O CONTRACTIONS STARTING AT 1500 TODAY.
REPORTS CONTRACTIONS ARE "ABOUT EVERY 5 MINUTES". DENIES LOF, REPORTS POSITIVE
FETAL MOVEMENT. EFM/TOCO PLACED. CATEGORY 1 STRIP. INITIAL SVE UPON ARRIVAL
1/50/-3. WILL RECHECK IN 1 HOUR.

## 2021-09-02 VITALS — DIASTOLIC BLOOD PRESSURE: 61 MMHG | SYSTOLIC BLOOD PRESSURE: 104 MMHG | HEART RATE: 67 BPM

## 2021-09-02 NOTE — NUR
DISCHARGE INSTRUCTION REVIEWED IN DEPTH AND UNDERSTOOD. PT DENIES ANY FURTHER
QUESTIONS OR CONCERNS AT THIS TIME. AMBULATORY FROM UNIT WITH FOB IN STABLE
CONDITION.

## 2021-09-05 ENCOUNTER — HOSPITAL ENCOUNTER (OUTPATIENT)
Dept: HOSPITAL 19 - LDRO | Age: 21
Discharge: HOME | End: 2021-09-05
Attending: OBSTETRICS & GYNECOLOGY
Payer: COMMERCIAL

## 2021-09-05 VITALS — DIASTOLIC BLOOD PRESSURE: 59 MMHG | SYSTOLIC BLOOD PRESSURE: 106 MMHG | HEART RATE: 79 BPM

## 2021-09-05 VITALS — HEIGHT: 65 IN | BODY MASS INDEX: 25.53 KG/M2 | WEIGHT: 153.22 LBS

## 2021-09-05 VITALS — SYSTOLIC BLOOD PRESSURE: 108 MMHG | HEART RATE: 85 BPM | TEMPERATURE: 99 F | DIASTOLIC BLOOD PRESSURE: 62 MMHG

## 2021-09-05 DIAGNOSIS — R10.9: ICD-10-CM

## 2021-09-05 DIAGNOSIS — O26.893: Primary | ICD-10-CM

## 2021-09-05 DIAGNOSIS — Z3A.40: ICD-10-CM

## 2021-09-05 NOTE — NUR
1305- 38.0, G1L0 here with c/o generalized abdominal/leg pain. Ambulatory to
LDR5. Changes into clean gown.
 
1317- This RN to bedside. EFM explained and placed x2. Traing well. VS
obtained. Assessment completed. Patient reports irregular contractions.
Reports normal fetal movement. Denies any LOF or VB.
 
1330- SVE by this RN 1/60/-3, AMAURY. POC reviewed with pt.
 
1333- Dr. Funes updated on patient. See physician notification. Hany updated
on POC, verbalizes understanding.

## 2021-09-05 NOTE — NUR
Discharge instructions reviewed. Patient verbalizes understanding. Changes out
of hospital gown. Off unit to private vehicle via .

## 2021-09-08 ENCOUNTER — HOSPITAL ENCOUNTER (INPATIENT)
Dept: HOSPITAL 19 - LDRO | Age: 21
LOS: 3 days | Discharge: HOME | End: 2021-09-11
Attending: OBSTETRICS & GYNECOLOGY | Admitting: OBSTETRICS & GYNECOLOGY
Payer: COMMERCIAL

## 2021-09-08 VITALS — SYSTOLIC BLOOD PRESSURE: 101 MMHG | DIASTOLIC BLOOD PRESSURE: 62 MMHG | HEART RATE: 80 BPM

## 2021-09-08 VITALS — SYSTOLIC BLOOD PRESSURE: 117 MMHG | HEART RATE: 90 BPM | DIASTOLIC BLOOD PRESSURE: 68 MMHG | TEMPERATURE: 98.7 F

## 2021-09-08 VITALS — HEART RATE: 84 BPM

## 2021-09-08 VITALS — DIASTOLIC BLOOD PRESSURE: 72 MMHG | SYSTOLIC BLOOD PRESSURE: 116 MMHG | HEART RATE: 96 BPM

## 2021-09-08 VITALS — DIASTOLIC BLOOD PRESSURE: 56 MMHG | HEART RATE: 80 BPM | SYSTOLIC BLOOD PRESSURE: 118 MMHG

## 2021-09-08 VITALS — TEMPERATURE: 98.7 F | DIASTOLIC BLOOD PRESSURE: 59 MMHG | SYSTOLIC BLOOD PRESSURE: 104 MMHG | HEART RATE: 91 BPM

## 2021-09-08 VITALS — BODY MASS INDEX: 25.53 KG/M2 | WEIGHT: 153.22 LBS | HEIGHT: 65 IN

## 2021-09-08 VITALS — SYSTOLIC BLOOD PRESSURE: 117 MMHG | HEART RATE: 90 BPM | DIASTOLIC BLOOD PRESSURE: 68 MMHG

## 2021-09-08 VITALS — SYSTOLIC BLOOD PRESSURE: 110 MMHG | HEART RATE: 84 BPM | DIASTOLIC BLOOD PRESSURE: 62 MMHG

## 2021-09-08 DIAGNOSIS — Z3A.40: ICD-10-CM

## 2021-09-08 DIAGNOSIS — D64.9: ICD-10-CM

## 2021-09-08 DIAGNOSIS — O48.0: Primary | ICD-10-CM

## 2021-09-08 DIAGNOSIS — F32.9: ICD-10-CM

## 2021-09-08 DIAGNOSIS — F12.90: ICD-10-CM

## 2021-09-08 DIAGNOSIS — F41.9: ICD-10-CM

## 2021-09-08 LAB
ALBUMIN SERPL-MCNC: 4.1 GM/DL (ref 3.5–5)
ALP SERPL-CCNC: 187 U/L (ref 50–136)
ALT SERPL-CCNC: 8 U/L (ref 4–34)
ANION GAP SERPL CALC-SCNC: 10 MMOL/L (ref 7–16)
AST SERPL-CCNC: 18 U/L (ref 15–37)
BASOPHILS # BLD: 0 10*3/UL (ref 0–0.2)
BASOPHILS NFR BLD AUTO: 0.3 % (ref 0–2)
BILIRUB SERPL-MCNC: 0.3 MG/DL (ref 0–1)
BUN SERPL-MCNC: 5 MG/DL (ref 7–17)
CALCIUM SERPL-MCNC: 9 MG/DL (ref 8.4–10.2)
CHLORIDE SERPL-SCNC: 104 MMOL/L (ref 98–107)
CO2 SERPL-SCNC: 22 MMOL/L (ref 22–30)
CREAT SERPL-SCNC: 0.59 UMOL/L (ref 0.52–1.25)
EOSINOPHIL # BLD: 0.1 10*3/UL (ref 0–0.7)
EOSINOPHIL NFR BLD: 0.4 % (ref 0–4)
ERYTHROCYTE [DISTWIDTH] IN BLOOD BY AUTOMATED COUNT: 13.5 % (ref 11.5–14.5)
GLUCOSE SERPL-MCNC: 86 MG/DL (ref 74–106)
GRANULOCYTES # BLD AUTO: 71.8 % (ref 42.2–75.2)
HCT VFR BLD AUTO: 33.4 % (ref 37–47)
HGB BLD-MCNC: 11.2 G/DL (ref 12.5–16)
LYMPHOCYTES # BLD: 2.2 10*3/UL (ref 1.2–3.4)
LYMPHOCYTES NFR BLD: 19.3 % (ref 20–51)
MCH RBC QN AUTO: 29 PG (ref 27–31)
MCHC RBC AUTO-ENTMCNC: 34 G/DL (ref 33–37)
MCV RBC AUTO: 86 FL (ref 80–100)
MONOCYTES # BLD: 0.8 10*3/UL (ref 0.1–0.6)
MONOCYTES NFR BLD AUTO: 7.2 % (ref 1.7–9.3)
NEUTROPHILS # BLD: 8.2 10*3/UL (ref 1.4–6.5)
PLATELET # BLD AUTO: 266 K/MM3 (ref 130–400)
PMV BLD AUTO: 10.6 FL (ref 7.4–10.4)
POTASSIUM SERPL-SCNC: 4 MMOL/L (ref 3.4–5)
PROT SERPL-MCNC: 7.6 GM/DL (ref 6.4–8.2)
RBC # BLD AUTO: 3.89 M/MM3 (ref 4.1–5.3)
SODIUM SERPL-SCNC: 135 MMOL/L (ref 137–145)
TSH SERPL DL<=0.005 MIU/L-ACNC: 1.62 UIU/ML (ref 0.35–4.94)
URN COLLECT METHOD CLASS: (no result)

## 2021-09-08 NOTE — NUR
DR. DELGADO IN ROOM TO EVALUATE PT. DISCUSSES CONCERNS OF FETAL TACHYCARDIA
SINCE ARRIVAL. WOULD LIKE TO RUN LABS AND PROCEED WITH PRIMARY C/S. DISCUSS
RISKS AND BENEFITS, MOTHER AND FOB VERBALIZE UNDERSTANDING, AGREE TO PRIMARY
C/S.

## 2021-09-08 NOTE — NUR
2108- Pt ambulatory onto unit. G1L0 and 40 and 3. Here for labor check because
she thinks her water may have broke this morning. EFM and TOCO connected to pt
and VS obtained and WNL. Denies VB or feeling regular contractions. Positive
fetal movement per pt. Dr. Reed called and situation explained, orders
given. See physician notification.

## 2021-09-09 VITALS — SYSTOLIC BLOOD PRESSURE: 117 MMHG | DIASTOLIC BLOOD PRESSURE: 59 MMHG | HEART RATE: 80 BPM

## 2021-09-09 VITALS — TEMPERATURE: 98.3 F | HEART RATE: 59 BPM | DIASTOLIC BLOOD PRESSURE: 67 MMHG | SYSTOLIC BLOOD PRESSURE: 110 MMHG

## 2021-09-09 VITALS — SYSTOLIC BLOOD PRESSURE: 119 MMHG | DIASTOLIC BLOOD PRESSURE: 71 MMHG | HEART RATE: 65 BPM

## 2021-09-09 VITALS — SYSTOLIC BLOOD PRESSURE: 111 MMHG | HEART RATE: 75 BPM | DIASTOLIC BLOOD PRESSURE: 66 MMHG

## 2021-09-09 VITALS — SYSTOLIC BLOOD PRESSURE: 126 MMHG | DIASTOLIC BLOOD PRESSURE: 64 MMHG | HEART RATE: 97 BPM

## 2021-09-09 VITALS — HEART RATE: 72 BPM | SYSTOLIC BLOOD PRESSURE: 104 MMHG | DIASTOLIC BLOOD PRESSURE: 63 MMHG

## 2021-09-09 VITALS — HEART RATE: 77 BPM | SYSTOLIC BLOOD PRESSURE: 104 MMHG | DIASTOLIC BLOOD PRESSURE: 62 MMHG

## 2021-09-09 VITALS — SYSTOLIC BLOOD PRESSURE: 102 MMHG | HEART RATE: 73 BPM | DIASTOLIC BLOOD PRESSURE: 61 MMHG

## 2021-09-09 VITALS — TEMPERATURE: 97.7 F | HEART RATE: 64 BPM | DIASTOLIC BLOOD PRESSURE: 57 MMHG | SYSTOLIC BLOOD PRESSURE: 106 MMHG

## 2021-09-09 VITALS — TEMPERATURE: 98.2 F | HEART RATE: 76 BPM | SYSTOLIC BLOOD PRESSURE: 107 MMHG | DIASTOLIC BLOOD PRESSURE: 59 MMHG

## 2021-09-09 VITALS — TEMPERATURE: 98.1 F | SYSTOLIC BLOOD PRESSURE: 107 MMHG | DIASTOLIC BLOOD PRESSURE: 69 MMHG | HEART RATE: 72 BPM

## 2021-09-09 VITALS — SYSTOLIC BLOOD PRESSURE: 113 MMHG | HEART RATE: 75 BPM | DIASTOLIC BLOOD PRESSURE: 67 MMHG

## 2021-09-09 VITALS — TEMPERATURE: 98 F | SYSTOLIC BLOOD PRESSURE: 102 MMHG | HEART RATE: 82 BPM | DIASTOLIC BLOOD PRESSURE: 61 MMHG

## 2021-09-09 VITALS — SYSTOLIC BLOOD PRESSURE: 111 MMHG | DIASTOLIC BLOOD PRESSURE: 73 MMHG | HEART RATE: 85 BPM

## 2021-09-09 VITALS — SYSTOLIC BLOOD PRESSURE: 99 MMHG | HEART RATE: 70 BPM | DIASTOLIC BLOOD PRESSURE: 62 MMHG

## 2021-09-09 VITALS — TEMPERATURE: 97.9 F | DIASTOLIC BLOOD PRESSURE: 59 MMHG | SYSTOLIC BLOOD PRESSURE: 104 MMHG | HEART RATE: 68 BPM

## 2021-09-09 VITALS — HEART RATE: 77 BPM | DIASTOLIC BLOOD PRESSURE: 65 MMHG | SYSTOLIC BLOOD PRESSURE: 105 MMHG

## 2021-09-09 VITALS — DIASTOLIC BLOOD PRESSURE: 66 MMHG | SYSTOLIC BLOOD PRESSURE: 108 MMHG | HEART RATE: 74 BPM

## 2021-09-09 VITALS — DIASTOLIC BLOOD PRESSURE: 71 MMHG | SYSTOLIC BLOOD PRESSURE: 115 MMHG | HEART RATE: 79 BPM

## 2021-09-09 VITALS — SYSTOLIC BLOOD PRESSURE: 104 MMHG | DIASTOLIC BLOOD PRESSURE: 65 MMHG | HEART RATE: 105 BPM | TEMPERATURE: 97.3 F

## 2021-09-09 LAB
DRUGS UR SCN NOM: NEGATIVE NG/ML
HCT VFR BLD AUTO: 28.7 % (ref 37–47)
HGB BLD-MCNC: 9.8 G/DL (ref 12.5–16)
PH UR STRIP.AUTO: 7 [PH] (ref 5–8)
RBC # UR: (no result) /HPF
SP GR UR STRIP.AUTO: 1.01 (ref 1–1.03)
SQUAMOUS # URNS: (no result) /HPF
WBC # UR: (no result) /HPF

## 2021-09-09 NOTE — NUR
PT REPORTS FULL SENSATION TO BLE. ABLE TO RAISE AND HOLD LEGS X5 SECONDS.
ASSISTED TO EDGE OF BED, DENIES DIZZINESS/NAUSEA/LIGHT HEADEDNESS. AMBULATORY
TO RESTROOM WITH SLOW STEADY GAIT. ASSISTED WITH ARUN CARE AND INTO CLEAN MESH
UNDERWEAR/PAD/GOWN. LOCHIA SCANT, NO CLOTS. PAIN MANAGEABLE. HUGHES DC'D WITH
650ML CLEAR YELLOW URINE EMPTIED PRIOR TO REMOVAL. INT TO RIGHT HAND DC'D PER
PT REQUEST. VITAL SIGNS STABLE. DENIES FURTHER NEEDS AT THIS TIME. CALL LIGHT
WITHIN REACH UPON EXITING. BABY REMAINS IN ROOM AT THIS TIME SLEEPING IN CRIB.

## 2021-09-09 NOTE — NUR
Initial visit; Patient thanked  for offering congratulations and God's
blessings for the birth of their daughter.  thanked family for
choosing our hospital.

## 2021-09-09 NOTE — NUR
met with patient and father of the baby to assess for needs.
This is first baby for both and they state that their families have helped
them secure all needed supplies and equipment for their baby.  Patient and
father of the baby reside in [place] and plan, in the near future will move to
be near her family in [place] and then possible to [place].  Patient
states she has health insurance through her mother.  Worker made arrangements
for our financial counselor to meet with patient to apply for [employer] for her
baby.  Patient states they don't qualify for [facility] at this time.  Worker
provided resource guidebook.  Mother and father of the baby deny unmet needs
at this time.  Worker and patient discussed mother's use of edible
cannabinoids throughout pregnancy and just prior to delivery.  Infant's urine
drug screen was positive for cannabinoids as well as mothers.  Mother denies
concern for addiction and need for treatment.  Worker advised that if mother
ever felt a need for treatment, that she could access this.  Mother states
that they have local family support from father's side if they need help.

## 2021-09-10 VITALS — DIASTOLIC BLOOD PRESSURE: 82 MMHG | TEMPERATURE: 98 F | SYSTOLIC BLOOD PRESSURE: 103 MMHG | HEART RATE: 87 BPM

## 2021-09-10 VITALS — DIASTOLIC BLOOD PRESSURE: 60 MMHG | TEMPERATURE: 98.3 F | SYSTOLIC BLOOD PRESSURE: 100 MMHG | HEART RATE: 83 BPM

## 2021-09-11 VITALS — SYSTOLIC BLOOD PRESSURE: 110 MMHG | DIASTOLIC BLOOD PRESSURE: 67 MMHG | TEMPERATURE: 97.9 F | HEART RATE: 71 BPM

## 2021-09-11 VITALS — HEART RATE: 68 BPM | TEMPERATURE: 98 F | DIASTOLIC BLOOD PRESSURE: 70 MMHG | SYSTOLIC BLOOD PRESSURE: 103 MMHG

## 2021-09-14 NOTE — NUR
Infant's cord was positive for cannabinoids.   filed a CPS report
#[id number] and faxed positive results to [facility].

## 2021-09-14 NOTE — NUR
On 9/9/2021,  faxed positive cannabinoid results for patient and
infant to [facility].

## 2022-01-30 ENCOUNTER — HOSPITAL ENCOUNTER (EMERGENCY)
Dept: HOSPITAL 19 - COL.ER | Age: 22
Discharge: HOME | End: 2022-01-30
Attending: EMERGENCY MEDICINE
Payer: MEDICAID

## 2022-01-30 VITALS — BODY MASS INDEX: 17.52 KG/M2 | HEIGHT: 65 IN | WEIGHT: 105.16 LBS

## 2022-01-30 VITALS — SYSTOLIC BLOOD PRESSURE: 100 MMHG | HEART RATE: 85 BPM | DIASTOLIC BLOOD PRESSURE: 60 MMHG

## 2022-01-30 VITALS — TEMPERATURE: 98.6 F

## 2022-01-30 DIAGNOSIS — U07.1: Primary | ICD-10-CM

## 2022-01-30 LAB — S PYO AG SPEC QL: NEGATIVE

## 2022-04-16 ENCOUNTER — HOSPITAL ENCOUNTER (EMERGENCY)
Dept: HOSPITAL 75 - ER | Age: 22
Discharge: LEFT BEFORE DISCHARGE | End: 2022-04-16
Payer: MEDICAID

## 2022-04-16 ENCOUNTER — HOSPITAL ENCOUNTER (EMERGENCY)
Dept: HOSPITAL 75 - ER | Age: 22
Discharge: HOME | End: 2022-04-16
Payer: COMMERCIAL

## 2022-04-16 VITALS — DIASTOLIC BLOOD PRESSURE: 61 MMHG | SYSTOLIC BLOOD PRESSURE: 112 MMHG

## 2022-04-16 VITALS — WEIGHT: 99.21 LBS | BODY MASS INDEX: 15.21 KG/M2 | HEIGHT: 67.72 IN

## 2022-04-16 VITALS — SYSTOLIC BLOOD PRESSURE: 133 MMHG | DIASTOLIC BLOOD PRESSURE: 78 MMHG

## 2022-04-16 VITALS — HEIGHT: 64.96 IN | WEIGHT: 101.19 LBS | BODY MASS INDEX: 16.86 KG/M2

## 2022-04-16 DIAGNOSIS — Z3A.00: ICD-10-CM

## 2022-04-16 DIAGNOSIS — O23.41: ICD-10-CM

## 2022-04-16 DIAGNOSIS — O20.9: Primary | ICD-10-CM

## 2022-04-16 DIAGNOSIS — N93.9: Primary | ICD-10-CM

## 2022-04-16 LAB
APTT PPP: YELLOW S
BACTERIA #/AREA URNS HPF: (no result) /HPF
BARBITURATES UR QL: NEGATIVE
BASOPHILS # BLD AUTO: 0.1 10^3/UL (ref 0–0.1)
BASOPHILS NFR BLD AUTO: 1 % (ref 0–10)
BENZODIAZ UR QL SCN: NEGATIVE
BILIRUB UR QL STRIP: NEGATIVE
COCAINE UR QL: NEGATIVE
EOSINOPHIL # BLD AUTO: 0 10^3/UL (ref 0–0.3)
EOSINOPHIL NFR BLD AUTO: 1 % (ref 0–10)
FIBRINOGEN PPP-MCNC: CLEAR MG/DL
GLUCOSE UR STRIP-MCNC: NEGATIVE MG/DL
HCT VFR BLD CALC: 33 % (ref 35–52)
HGB BLD-MCNC: 11.4 G/DL (ref 11.5–16)
KETONES UR QL STRIP: NEGATIVE
LEUKOCYTE ESTERASE UR QL STRIP: (no result)
LYMPHOCYTES # BLD AUTO: 2.6 10^3/UL (ref 1–4)
LYMPHOCYTES NFR BLD AUTO: 31 % (ref 12–44)
MANUAL DIFFERENTIAL PERFORMED BLD QL: NO
MCH RBC QN AUTO: 32 PG (ref 25–34)
MCHC RBC AUTO-ENTMCNC: 35 G/DL (ref 32–36)
MCV RBC AUTO: 91 FL (ref 80–99)
METHADONE UR QL SCN: NEGATIVE
METHAMPHETAMINE SCREEN URINE S: NEGATIVE
MONOCYTES # BLD AUTO: 0.5 10^3/UL (ref 0–1)
MONOCYTES NFR BLD AUTO: 6 % (ref 0–12)
NEUTROPHILS # BLD AUTO: 5.2 10^3/UL (ref 1.8–7.8)
NEUTROPHILS NFR BLD AUTO: 62 % (ref 42–75)
NITRITE UR QL STRIP: NEGATIVE
OPIATES UR QL SCN: NEGATIVE
OXYCODONE UR QL: NEGATIVE
PH UR STRIP: 7 [PH] (ref 5–9)
PLATELET # BLD: 260 10^3/UL (ref 130–400)
PMV BLD AUTO: 10.3 FL (ref 9–12.2)
PROPOXYPH UR QL: NEGATIVE
PROT UR QL STRIP: (no result)
RBC #/AREA URNS HPF: (no result) /HPF
SP GR UR STRIP: 1.02 (ref 1.02–1.02)
SQUAMOUS #/AREA URNS HPF: (no result) /HPF
TRICYCLICS UR QL SCN: NEGATIVE
WBC # BLD AUTO: 8.4 10^3/UL (ref 4.3–11)
WBC #/AREA URNS HPF: (no result) /HPF

## 2022-04-16 PROCEDURE — 36415 COLL VENOUS BLD VENIPUNCTURE: CPT

## 2022-04-16 PROCEDURE — 85025 COMPLETE CBC W/AUTO DIFF WBC: CPT

## 2022-04-16 PROCEDURE — 87088 URINE BACTERIA CULTURE: CPT

## 2022-04-16 PROCEDURE — 84702 CHORIONIC GONADOTROPIN TEST: CPT

## 2022-04-16 PROCEDURE — 84703 CHORIONIC GONADOTROPIN ASSAY: CPT

## 2022-04-16 PROCEDURE — 86901 BLOOD TYPING SEROLOGIC RH(D): CPT

## 2022-04-16 PROCEDURE — 81000 URINALYSIS NONAUTO W/SCOPE: CPT

## 2022-04-16 PROCEDURE — 86900 BLOOD TYPING SEROLOGIC ABO: CPT

## 2022-04-16 PROCEDURE — 80306 DRUG TEST PRSMV INSTRMNT: CPT

## 2022-04-16 NOTE — ED GU-FEMALE
General


Chief Complaint:  OB < 20 WEEKS


Stated Complaint:  VAG BLEEDING POSS PREGNANT


Nursing Triage Note:  


patient verbalized this am noted red blood. patient states no blood since this 


morning, but now has abdominal cramps


Source:  patient


Exam Limitations:  no limitations





History of Present Illness


Date Seen by Provider:  2022


Time Seen by Provider:  13:56


Initial Comments


This 21-year-old young lady presents to the emergency room with complaints of 

abdominal cramping and vaginal spotting that started earlier today.  Her LMP was

.  Cramping gets better if she lies down.  She denies pelvic tenderness,

vaginal pain, pain with intercourse, vaginal discharge, or urinary symptoms.  

She has an obstetrician in [place] with whom she intends to follow-up.  She 

has not yet had an ultrasound.





Allergies and Home Medications


Allergies


Coded Allergies:  


     No Known Drug Allergies (Unverified , 10/18/20)





Patient Home Medication List


Home Medication List Reviewed:  Yes


Cephalexin (Cephalexin) 500 Mg Tablet, 500 MG PO TID


   Prescribed by: JANKI ORDONEZ on 22 1626


Hydroxyzine Pamoate (Vistaril) 50 Mg Capsule, 50 MG PO Q6H


   Prescribed by: GERMAIN MONTES on 10/21/18 0218


Nitrofurantoin Monohyd/M-Cryst (Macrobid 100 mg Capsule) 100 Mg Capsule, 100 MG 

PO BID


   Prescribed by: GERMAIN MONTES on 10/21/18 0218





Review of Systems


Review of Systems


Constitutional:  no symptoms reported


EENTM:  no symptoms reported


Respiratory:  no symptoms reported


Cardiovascular:  no symptoms reported


Gastrointestinal:  no symptoms reported


Genitourinary:  see HPI


LMP:  Mar 13, 2022


Musculoskeletal:  no symptoms reported


Skin:  no symptoms reported


Psychiatric/Neurological:  No Symptoms Reported


Endocrine:  No Symptoms Reported


Hematologic/Lymphatic:  No Symptoms Reported





Past Medical-Social-Family Hx


Patient Social History


Tobacco Use?:  No


Use of E-Cig and/or Vaping dev:  No


Substance use?:  Yes


Substance type:  Marijuana


Alcohol Use?:  No


Pt feels they are or have been:  No





Immunizations Up To Date


PED Vaccines UTD:  Yes





Seasonal Allergies


Seasonal Allergies:  No





Past Medical History


Surgeries:  Yes (BILATERAL KNEE SCOPES; WISDOM TEETH REMOVED)


 Section, Orthopedic


Respiratory:  No


Cardiac:  No


Neurological:  No


Genitourinary:  No


Gastrointestinal:  No


Musculoskeletal:  Yes (BILATERAL KNEE SURG)


Endocrine:  No


HEENT:  Yes (S/P T&A)


Tonsilitis


Cancer:  No


Psychosocial:  Yes (ANOREXIA, postpartum depression)


Eating Disorder, Anxiety


Integumentary:  No


Blood Disorders:  No





Physical Exam


Vital Signs





Vital Signs - First Documented








 22





 14:01


 


Temp 36.4


 


Pulse 96


 


Resp 18


 


B/P (MAP) 133/78 (96)


 


Pulse Ox 100


 


O2 Delivery Room Air





Capillary Refill : Less Than 3 Seconds


Height, Weight, BMI


Height: 5'5.00"


Weight: 110lbs. oz. [id number]; 15.00 BMI


Method:Estimated


General Appearance:  WD/WN, no apparent distress, thin


HEENT:  normal ENT inspection


Neck:  normal inspection


Cardiovascular:  regular rate, rhythm, no edema, no murmur


Respiratory:  lungs clear, normal breath sounds, no respiratory distress


Gastrointestinal:  normal bowel sounds, non tender, soft


Extremities:  normal inspection, no pedal edema


Neurologic/Psychiatric:  no motor/sensory deficits, alert, normal mood/affect, 

oriented x 3


Skin:  normal color, warm/dry





Progress/Results/Core Measures


Suspected Sepsis


SIRS


Temperature: 


Pulse: 96 


Respiratory Rate: 18


 


Laboratory Tests


22 15:20: White Blood Count 8.4


Blood Pressure 133 /78 


Mean: 96


 


Laboratory Tests


22 15:20: Platelet Count 260








Results/Orders


Lab Results





Laboratory Tests








Test


 22


13:56 22


15:20 Range/Units


 


 


Urine Color YELLOW    


 


Urine Clarity CLEAR    


 


Urine pH 7.0   5-9  


 


Urine Specific Gravity 1.025 H  1.016-1.022  


 


Urine Protein 2+ H  NEGATIVE  


 


Urine Glucose (UA) NEGATIVE   NEGATIVE  


 


Urine Ketones NEGATIVE   NEGATIVE  


 


Urine Nitrite NEGATIVE   NEGATIVE  


 


Urine Bilirubin NEGATIVE   NEGATIVE  


 


Urine Urobilinogen 0.2   < = 1.0  MG/DL


 


Urine Leukocyte Esterase TRACE H  NEGATIVE  


 


Urine RBC (Auto) TRACE-I H  NEGATIVE  


 


Urine RBC NONE    /HPF


 


Urine WBC 5-10 H   /HPF


 


Urine Squamous Epithelial


Cells 0-2 


 


  /HPF





 


Urine Crystals NONE    /LPF


 


Urine Bacteria TRACE    /HPF


 


Urine Casts NONE    /LPF


 


Urine Mucus SMALL H   /LPF


 


Urine Culture Indicated YES    


 


Urine Opiates Screen NEGATIVE   NEGATIVE  


 


Urine Oxycodone Screen NEGATIVE   NEGATIVE  


 


Urine Methadone Screen NEGATIVE   NEGATIVE  


 


Urine Propoxyphene Screen NEGATIVE   NEGATIVE  


 


Urine Barbiturates Screen NEGATIVE   NEGATIVE  


 


Ur Tricyclic Antidepressants


Screen NEGATIVE 


 


 NEGATIVE  





 


Urine Phencyclidine Screen NEGATIVE   NEGATIVE  


 


Urine Amphetamines Screen NEGATIVE   NEGATIVE  


 


Urine Methamphetamines Screen NEGATIVE   NEGATIVE  


 


Urine Benzodiazepines Screen NEGATIVE   NEGATIVE  


 


Urine Cocaine Screen NEGATIVE   NEGATIVE  


 


Urine Cannabinoids Screen POSITIVE H  NEGATIVE  


 


White Blood Count


 


 8.4 


 4.3-11.0


10^3/uL


 


Red Blood Count


 


 3.59 L


 3.80-5.11


10^6/uL


 


Hemoglobin  11.4 L 11.5-16.0  g/dL


 


Hematocrit  33 L 35-52  %


 


Mean Corpuscular Volume  91  80-99  fL


 


Mean Corpuscular Hemoglobin  32  25-34  pg


 


Mean Corpuscular Hemoglobin


Concent 


 35 


 32-36  g/dL





 


Red Cell Distribution Width  13.1  10.0-14.5  %


 


Platelet Count


 


 260 


 130-400


10^3/uL


 


Mean Platelet Volume  10.3  9.0-12.2  fL


 


Immature Granulocyte % (Auto)  0   %


 


Neutrophils (%) (Auto)  62  42-75  %


 


Lymphocytes (%) (Auto)  31  12-44  %


 


Monocytes (%) (Auto)  6  0-12  %


 


Eosinophils (%) (Auto)  1  0-10  %


 


Basophils (%) (Auto)  1  0-10  %


 


Neutrophils # (Auto)


 


 5.2 


 1.8-7.8


10^3/uL


 


Lymphocytes # (Auto)


 


 2.6 


 1.0-4.0


10^3/uL


 


Monocytes # (Auto)


 


 0.5 


 0.0-1.0


10^3/uL


 


Eosinophils # (Auto)


 


 0.0 


 0.0-0.3


10^3/uL


 


Basophils # (Auto)


 


 0.1 


 0.0-0.1


10^3/uL


 


Immature Granulocyte # (Auto)


 


 0.0 


 0.0-0.1


10^3/uL


 


Human Chorionic Gonadotropin,


Quant 


 259 H


 <5  MIU/ML











My Orders





Orders - JANKI REYES MD


Cbc With Automated Diff (22 14:44)


Drug Screen Stat (Urine) (22 14:44)


Hcg,Quantitative (22 14:44)


Ua Culture If Indicated (22 14:44)


Abo Rh Type (22 14:44)


Urine Culture (22 13:56)





Vital Signs/I&O











 22





 14:01 16:30


 


Temp 36.4 36.4


 


Pulse 96 96


 


Resp 18 18


 


B/P (MAP) 133/78 (96) 133/78


 


Pulse Ox 100 100


 


O2 Delivery Room Air Room Air





Capillary Refill : Less Than 3 Seconds








Blood Pressure Mean:                    96








Progress Note :  


Progress Note


hCG was very low and patient had no tenderness on exam.  Ultrasound was deferred

therefore until outpatient follow-up.  Patient was advised to return if symptoms

worsened.  See discharge instructions.  She was additionally treated for urinary

tract infection identified by urinalysis.





Departure


Impression





   Primary Impression:  


   Vaginal bleeding during pregnancy


   Additional Impression:  


   Urinary tract infection


   Qualified Codes:  N39.0 - Urinary tract infection, site not specified


Disposition:   HOME, SELF-CARE


Condition:  Stable





Departure-Patient Inst.


Decision time for Depature:  16:24


Referrals:  


NO,LOCAL PHYSICIAN (PCP/Family)


Primary Care Physician


Patient Instructions:  Bleeding In Early Pregnancy, Urinary Tract Infection, 

Adult ED





Add. Discharge Instructions:  


The status of your pregnancy is uncertain at this time.  You should definitely 

be following up within the next 48 hours to have a repeat hCG level drawn and/or

an ultrasound to confirm the status of the pregnancy and rule out ectopic 

pregnancy.  Your hCG level today was 259.  Return to the emergency room promptly

if you develop escalating pain or tenderness in the pelvis or if you have 

heavier hemorrhage like bleeding.  Complete your antibiotic as prescribed.  

Drink plenty of clear liquids to stay well-hydrated.





All discharge instructions reviewed with patient and/or family. Voiced 

understanding.


Scripts


Cephalexin (Cephalexin) 500 Mg Tablet


500 MG PO TID, #21 TAB


   Prov: JANKI REYES MD         22











JANKI REYES MD        2022 16:26